# Patient Record
Sex: MALE | Race: WHITE | NOT HISPANIC OR LATINO | URBAN - METROPOLITAN AREA
[De-identification: names, ages, dates, MRNs, and addresses within clinical notes are randomized per-mention and may not be internally consistent; named-entity substitution may affect disease eponyms.]

---

## 2020-03-20 PROBLEM — 266433003: Status: ACTIVE | Noted: 2020-01-14

## 2020-06-17 ENCOUNTER — OFFICE VISIT (OUTPATIENT)
Dept: URBAN - METROPOLITAN AREA CLINIC 33 | Facility: CLINIC | Age: 30
End: 2020-06-17

## 2020-06-30 ENCOUNTER — DASHBOARD ENCOUNTERS (OUTPATIENT)
Age: 30
End: 2020-06-30

## 2020-06-30 ENCOUNTER — OFFICE VISIT (OUTPATIENT)
Dept: URBAN - METROPOLITAN AREA CLINIC 35 | Facility: CLINIC | Age: 30
End: 2020-06-30

## 2020-06-30 VITALS — BODY MASS INDEX: 24.38 KG/M2 | HEIGHT: 74 IN | WEIGHT: 190 LBS

## 2020-06-30 RX ORDER — GUAIFENESIN AND DEXTROMETHORPHAN HYDROBROMIDE 600; 30 MG/1; MG/1
1 TABLET AS NEEDED TABLET, EXTENDED RELEASE ORAL
Status: ON HOLD | COMMUNITY

## 2020-06-30 RX ORDER — AMOXICILLIN AND CLAVULANATE POTASSIUM 250; 125 MG/1; MG/1
1 TABLET TABLET, FILM COATED ORAL
Status: ON HOLD | COMMUNITY

## 2020-06-30 RX ORDER — RANITIDINE HYDROCHLORIDE 150 MG/1
1 TABLET AT BEDTIME TABLET, FILM COATED ORAL ONCE A DAY
Status: ON HOLD | COMMUNITY

## 2020-06-30 RX ORDER — FAMOTIDINE 20 MG/1
1 TABLET TABLET, FILM COATED ORAL
Qty: 90 | Refills: 1 | Status: ACTIVE | COMMUNITY
Start: 2020-01-14

## 2020-06-30 RX ORDER — PSEUDOEPHEDRINE HCL 30 MG
1 TABLET AS NEEDED TABLET ORAL
Status: ON HOLD | COMMUNITY

## 2020-06-30 RX ORDER — LORATADINE 10 MG/1
1 TABLET TABLET ORAL ONCE A DAY
Status: ON HOLD | COMMUNITY

## 2020-06-30 RX ORDER — PANTOPRAZOLE SODIUM 20 MG/1
1 TABLET TABLET, DELAYED RELEASE ORAL
Qty: 90 | Refills: 1 | Status: ACTIVE | COMMUNITY
Start: 2020-01-14

## 2020-06-30 NOTE — HPI-MIGRATED HPI
;   ;     Gastroesophageal reflux disease : Patient presents today for follow up of GERD via televisit and consent has been obtained.   He continues to do well on Pantoprazole 20 mg and Famotidine 20 mg. He admits that if he misses a dose he will have abdominal discomfort.   No constipation. Has 1 BM a day. No melena, no rectal bleeding.  No dysphagia;   Abdominal Pain : Patient denies abdominal pain at this time.   Last visit (2/26/2020) Patient presents today for follow up of abdominal pain and to review US.  Patient is currently taking Pantoprazole 40 mg and Famotidine 20 mg. He admits significant improvement of abdominal pain.  Abdominal US was completely NL   Last visit (1/14/2020) Patient presents today for follow up after his EGD. Since the procedure patient denies dysphagia, globus, changes in appetite, and changes in bowel habits.  He continues to have some discomfort/epigastric pain when ever his stomach is empty; around 50 % improvement with Pepcid BID.  Patient is requesting to be tested to C-Diff; mother works is a nurse and has told him had seen patient's with similar symptoms that were C Diff positive. Patient has NO diarrhea.   He admits 1-2 BM's per day, with soft stool. He denies melena, blood, or mucus in stool.   EGD by Dr Sinha and path report as documented below.    Last visit (11/12/2019) Patient presents today for Re H&P, his last visit is &lt;90 days. Patient admits epigastric discomfort. Patient admits this occurs daily in the am prior to consuming a meal, and at bed time.   Patient continues to have epigastric discomfort between meals, he has discontinue the Zantac due to recent warnings and has started on Pepcid OTC once a day. Pain is becoming more often after meals mostly after dinner. He describes burning type pain during the night and it has been worse since he stopped Zantac and changed to Pepcid. No blood in stool and no melena. No fever, No CP, no SOB. No dysphagia  Last visit (6/4/2019) 28 y/o male patient presents today with c/o abdominal pain and possible acute ulcer.   Patient admits 1-2 BMs a day with stools are soft and mushy.  Patient denies rectal bleeding, melena, or mucus at this time.  Over the last 6 months started to have upper abdominal pain in between meals, when stomach is empty. The more he ate the better he felt, less pain. If he did not eat, it will hurt until he ate. He has been taking Ibuprofen for 2 yrs but over the last 6 months he has been taking it more regular for back pain (he has lower back fusion). Patient quit Ibuprofen 2 months ago. He did a 6 weeks of Omeprazole and Carafate. It helped for the first 2 week but then pain came back. He was then switched to Zantac 150 mg  BID 2 weeks ago. Zantac is the first medication that has really helped him. No N/V. No dysphagia He does have intermittent heartburn but significant enough that  he has cut off his diet any citrus, tomatoes, coffee, ETOH, pepper or anything spicy. BMs are soft 1-3 times a day. No melena and no visible blood. No weight loss unexpected.  He has always had minor GI issues mostly noisy abdomen. Patient tells me his father has symptoms of IBS although not diagnosed.;

## 2020-08-09 ENCOUNTER — TELEPHONE ENCOUNTER (OUTPATIENT)
Dept: URBAN - METROPOLITAN AREA CLINIC 35 | Facility: CLINIC | Age: 30
End: 2020-08-09